# Patient Record
Sex: MALE | Race: WHITE | URBAN - METROPOLITAN AREA
[De-identification: names, ages, dates, MRNs, and addresses within clinical notes are randomized per-mention and may not be internally consistent; named-entity substitution may affect disease eponyms.]

---

## 2017-01-16 ENCOUNTER — TELEPHONE (OUTPATIENT)
Dept: CARDIOLOGY | Facility: CLINIC | Age: 32
End: 2017-01-16

## 2017-01-16 NOTE — TELEPHONE ENCOUNTER
Patient calling in stating that he would like to be seen for chest pain.  Patient states that he has had this chest pain for eight years, he usually gets it if he is stressed or hasn't been sleeping well.  It happens very rarely with exercise.  Patient denies any pain radiating to his jaw or arms or shortness of breath.  He states that he also has exercise induced asthma so when he works out he may be little more short of breath then normal.  He states he was seen a long time ago for an irregular heart beat and had an EKG.  He hasn't had much issues with that since he has cut back on his caffeine intake.   Instructed patient to present to the ER if he develops any nausea, vomiting, shortness of breath or pain radiating. Appointment set up to see Cardiology this Saturday.  Patient states that he understands information provided and will call back with any further questions or concerns.    Kiel Chaidez, RN  RN Care Coordinator  [facility]  [phone]

## 2017-01-20 ENCOUNTER — PRE VISIT (OUTPATIENT)
Dept: CARDIOLOGY | Facility: CLINIC | Age: 32
End: 2017-01-20

## 2017-01-20 DIAGNOSIS — R07.9 CHEST PAIN: Primary | ICD-10-CM

## 2017-01-21 ENCOUNTER — OFFICE VISIT (OUTPATIENT)
Dept: CARDIOLOGY | Facility: CLINIC | Age: 32
End: 2017-01-21
Attending: INTERNAL MEDICINE
Payer: COMMERCIAL

## 2017-01-21 VITALS
DIASTOLIC BLOOD PRESSURE: 75 MMHG | BODY MASS INDEX: 26.56 KG/M2 | OXYGEN SATURATION: 97 % | WEIGHT: 169.2 LBS | SYSTOLIC BLOOD PRESSURE: 125 MMHG | HEIGHT: 67 IN | HEART RATE: 77 BPM

## 2017-01-21 DIAGNOSIS — R07.89 ATYPICAL CHEST PAIN: Primary | ICD-10-CM

## 2017-01-21 PROCEDURE — 99212 OFFICE O/P EST SF 10 MIN: CPT | Mod: ZF

## 2017-01-21 PROCEDURE — 93010 ELECTROCARDIOGRAM REPORT: CPT | Mod: ZP | Performed by: INTERNAL MEDICINE

## 2017-01-21 PROCEDURE — 99204 OFFICE O/P NEW MOD 45 MIN: CPT | Performed by: INTERNAL MEDICINE

## 2017-01-21 PROCEDURE — 93005 ELECTROCARDIOGRAM TRACING: CPT | Mod: ZF

## 2017-01-21 ASSESSMENT — PAIN SCALES - GENERAL: PAINLEVEL: NO PAIN (0)

## 2017-01-21 NOTE — MR AVS SNAPSHOT
After Visit Summary   1/21/2017    Javier Fowler    MRN: [id number]           Patient Information     Date Of Birth          1985        Visit Information        Provider Department      1/21/2017 11:00 AM AMANDA Cobos MD [facility]        Today's Diagnoses     Chest pain    -  1       Care Instructions    You were seen today in the Cardiovascular Clinic at the [facility].     Cardiology Providers you saw during your visit: Dr Seymour Rosenberg    Diagnosis: Chest pain    Results: Dr Dorman reviewed the results of your EKG with you in clinic today    Recommendations:   1.  We will schedule an exercise stress test.  For this, nothing to eat or drink 3 hour prior.  No caffeine, alcohol, or tobacco 12 hours prior.  2.  We will draw a lipid panel.  For this, nothing to eat or drink 8 hours prior except for water    Follow-up: Follow up to be determined based on results of testing.       For emergencies call 911.    For any scheduling needs or nursing related questions, please call [phone].    Thank you for your visit today! If you have questions or concerns about today's visit, please call me.      Kiel Chaidez RN  RN Care Coordinator  [facility]  [phone]    Press option 1 for the University and then 3 for nursing related questions                Follow-ups after your visit        Follow-up notes from your care team     Return if symptoms worsen or fail to improve, for Dr Dorman Follow up Dx. chest pain.      Your next 10 appointments already scheduled     Jan 31, 2017 10:00 AM   LAB with  LAB    Health Lab ([facility])    [address] [place] [id number] [phone]           Patient must bring picture ID.  Patient should be prepared to give a urine specimen  Please do not eat 10-12 hours before your appointment if you are coming in fasting for labs on lipids, cholesterol, or  glucose (sugar).  Pregnant women should follow their Care Team instructions. Water with medications is okay. Do not drink coffee or other fluids.   If you have concerns about taking  your medications, please ask at office or if scheduling via Bloglovin, send a message by clicking on Secure Messaging, Message Your Care Team.            Jan 31, 2017 10:30 AM   Cardiac Stress Test with [facility]   [facility] ([facility])    [address]  [facility] [place] [address]              1.  Please bring or wear a comfortable two-piece outfit and walking shoes. 2.  Stop eating 3 hours before the test. You may drink water or juice. 3.  Stop all caffeine 12 hours before the test. This includes coffee, tea, soda pop, chocolate and certain medicines (such as Anacin and Excederin). Also avoid decaf coffee and tea, as these contain small amounts of caffeine. 4.  No alcohol, smoking or use of other tobacco products for 12 hours before the test. 5.  Refer to your provider instructions to see if you need to stop any medications (such as beta-blockers or nitrates) for this test. 6.  For patients with diabetes:     -   If you take insulin, call your diabetes care team. Ask if you should take a   dose the morning of your test.     -   If you take diabetes medicine by mouth, don't take it on the morning of your test. Bring it with you to take after the test.  (If you have questions, call your diabetes care team) 7.  When you arrive, please tell us if:     -   You have diabetes.     -   You have taken Viagra, Cialis or Levitra in the past 48 hours. 8.  For any questions that cannot be answered, please contact the ordering physician              Future tests that were ordered for you today     Open Future Orders        Priority Expected Expires Ordered    Exercise Stress test Routine  3/7/2017 1/21/2017    Lipid panel reflex to direct LDL Routine  1/22/2018 1/21/2017            Who to contact     If  "you have questions or need follow up information about today's clinic visit or your schedule please contact [facility] directly at [phone].  Normal or non-critical lab and imaging results will be communicated to you by babbelhart, letter or phone within 4 business days after the clinic has received the results. If you do not hear from us within 7 days, please contact the clinic through babbelhart or phone. If you have a critical or abnormal lab result, we will notify you by phone as soon as possible.  Submit refill requests through Hairdressr or call your pharmacy and they will forward the refill request to us. Please allow 3 business days for your refill to be completed.          Additional Information About Your Visit        Hairdressr Information     Hairdressr gives you secure access to your electronic health record. If you see a primary care provider, you can also send messages to your care team and make appointments. If you have questions, please call your primary care clinic.  If you do not have a primary care provider, please call [phone] and they will assist you.        Care EveryWhere ID     This is your Care EveryWhere ID. This could be used by other organizations to access your [facility] medical records  [id number]        Your Vitals Were     Pulse Height BMI (Body Mass Index) Pulse Oximetry          77 1.702 m (5' 7\") 26.49 kg/m2 97%         Blood Pressure from Last 3 Encounters:   01/21/17 125/75    Weight from Last 3 Encounters:   01/21/17 76.749 kg (169 lb 3.2 oz)              We Performed the Following     EKG 12-lead, tracing only (Future)        Primary Care Provider    None Specified       No primary provider on file.        Thank you!     Thank you for choosing [facility]  for your care. Our goal is always to provide you with excellent care. Hearing back from our patients is one way we can continue to improve our services. Please take a few minutes to complete the written survey " that you may receive in the mail after your visit with us. Thank you!             Your Updated Medication List - Protect others around you: Learn how to safely use, store and throw away your medicines at www.disposemymeds.org.          This list is accurate as of: 1/21/17 11:23 AM.  Always use your most recent med list.                   Brand Name Dispense Instructions for use    BENADRYL PO      Take 1 tablet by mouth as needed       MELATONIN PO      Take 1 tablet by mouth as needed       MULTIVITAMIN PO      Take 1 tablet by mouth

## 2017-01-21 NOTE — NURSING NOTE
Cardiac Testing: Patient given instructions regarding exercise stress test. Discussed purpose, preparation, procedure and when to expect results reported back to the patient. Patient demonstrated understanding of this information and agreed to call with further questions or concerns.    Labs:  Patient demonstrated understanding of this information and agreed to call with further questions or concerns.     Med Reconcile: Reviewed and verified all current medications with the patient. The updated medication list was printed and given to the patient.    Return Appointment: Follow up to be determined based on results of testing.  Patient given instructions regarding scheduling next clinic visit. Patient demonstrated understanding of this information and agreed to call with further questions or concerns.    Patient stated he understood all health information given and agreed to call with further questions or concerns.    Kiel Chaidez, RN  RN Care Coordinator  [facility]  [phone]

## 2017-01-21 NOTE — Clinical Note
Date:January 23, 2017      Patient was self referred, no letter generated. Do not send.        [facility] Information

## 2017-01-21 NOTE — PROGRESS NOTES
SUBJECTIVE:  Javier Fowler is a 31 year old male who presents for evaluation of chest pain. Very atypical.  Get chest pain when he is resting at night. 3/10. No associated symptoms.  Exercise in gym regularly with no symptoms. Can bike miles with no problem..  Chest pain also when he smoke. Quit smoking. Smoked pot during grad school.  Had symptoms for over few years but he was trying to figure out the cause.  No co-morbidities.  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    Patient Active Problem List    Diagnosis Date Noted     Chest pain      Priority: Medium    .  Current Outpatient Prescriptions   Medication Sig     Multiple Vitamins-Minerals (MULTIVITAMIN PO) Take 1 tablet by mouth     MELATONIN PO Take 1 tablet by mouth as needed     DiphenhydrAMINE HCl (BENADRYL PO) Take 1 tablet by mouth as needed     No current facility-administered medications for this visit.     Past Medical History   Diagnosis Date     Chest pain      No past surgical history on file.  Allergies   Allergen Reactions     Amoxicillin Rash     Penicillins Rash     Social History     Social History     Marital Status: Single     Spouse Name: N/A     Number of Children: N/A     Years of Education: N/A     Occupational History     Not on file.     Social History Main Topics     Smoking status: Never Smoker      Smokeless tobacco: Not on file     Alcohol Use: Yes      Comment: socially     Drug Use: No      Comment: marijuana previously     Sexual Activity: Not on file     Other Topics Concern     Not on file     Social History Narrative     No family history on file.       REVIEW OF SYSTEMS:  General: negative, fever, chills, night sweats  Skin: negative, acne, rash and scaling  Eyes: negative, double vision, eye pain and photophobia  Ears/Nose/Throat: negative, nasal congestion and purulent rhinorrhea  Respiratory: No dyspnea on exertion, No cough, No hemoptysis and negative  Cardiovascular: negative, palpitations, tachycardia,  "irregular heart beat, exertional chest pain or pressure, paroxysmal nocturnal dyspnea, dyspnea on exertion and orthopnea  Gastrointestinal: negative, dysphagia, nausea, vomiting and heartburn  Genitourinary: negative, nocturia, dysuria and frequency  Musculoskeletal: negative, fracture, back pain and neck pain  Neurologic: negative, headaches, syncope, stroke, seizures and paralysis  Psychiatric: negative, nervous breakdown, thoughts of self-harm and thoughts of hurting someone else  Hematologic/Lymphatic/Immunologic: negative, bleeding disorder, chills and fever  Endocrine: negative, cold intolerance, heat intolerance and hot flashes       OBJECTIVE:  Blood pressure 125/75, pulse 77, height 1.702 m (5' 7\"), weight 76.749 kg (169 lb 3.2 oz), SpO2 97 %.  General Appearance: healthy, alert, active and no distress  Head: Normocephalic. No masses, lesions, tenderness or abnormalities  Eyes: conjuctiva clear, PERRL, EOM intact  Ears: External ears normal. Canals clear. TM's normal.  Nose: Nares normal  Mouth: normal  Neck: Supple, no cervical adenopathy, no thyromegaly  Lungs: clear to auscultation  Cardiac: regular rate and rhythm, normal S1 and S2, no murmur  Abdomen: Soft, nontender.  Normal bowel sounds.  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 None at Gym or biking. No co-morbidities. Ex smiker. No premature CAD in family.  Patient re-assured as his symptoms sound non cardiac.  Will check a stress echo.  Also will get a lipid profile.    EKG done in clinic reviewed. NSR.Normal.  Per orders.   Return to Clinic  PRN.  "

## 2017-01-21 NOTE — Clinical Note
1/21/2017      RE: Javier Fowler  [address] [place] [address]       Dear Colleague,    Thank you for the opportunity to participate in the care of your patient, Javier Fowler, at the [facility]. Please see a copy of my visit note below.       SUBJECTIVE:  Javier Fowler is a 31 year old male who presents for evaluation of chest pain. Very atypical.  Get chest pain when he is resting at night. 3/10. No associated symptoms.  Exercise in gym regularly with no symptoms. Can bike miles with no problem..  Chest pain also when he smoke. Quit smoking. Smoked pot during grad school.  Had symptoms for over few years but he was trying to figure out the cause.  No co-morbidities.  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    Patient Active Problem List    Diagnosis Date Noted     Chest pain      Priority: Medium    .  Current Outpatient Prescriptions   Medication Sig     Multiple Vitamins-Minerals (MULTIVITAMIN PO) Take 1 tablet by mouth     MELATONIN PO Take 1 tablet by mouth as needed     DiphenhydrAMINE HCl (BENADRYL PO) Take 1 tablet by mouth as needed     No current facility-administered medications for this visit.     Past Medical History   Diagnosis Date     Chest pain      No past surgical history on file.  Allergies   Allergen Reactions     Amoxicillin Rash     Penicillins Rash     Social History     Social History     Marital Status: Single     Spouse Name: N/A     Number of Children: N/A     Years of Education: N/A     Occupational History     Not on file.     Social History Main Topics     Smoking status: Never Smoker      Smokeless tobacco: Not on file     Alcohol Use: Yes      Comment: socially     Drug Use: No      Comment: marijuana previously     Sexual Activity: Not on file     Other Topics Concern     Not on file     Social History Narrative     No family history on file.       REVIEW OF SYSTEMS:  General: negative, fever,  "chills, night sweats  Skin: negative, acne, rash and scaling  Eyes: negative, double vision, eye pain and photophobia  Ears/Nose/Throat: negative, nasal congestion and purulent rhinorrhea  Respiratory: No dyspnea on exertion, No cough, No hemoptysis and negative  Cardiovascular: negative, palpitations, tachycardia, irregular heart beat, exertional chest pain or pressure, paroxysmal nocturnal dyspnea, dyspnea on exertion and orthopnea  Gastrointestinal: negative, dysphagia, nausea, vomiting and heartburn  Genitourinary: negative, nocturia, dysuria and frequency  Musculoskeletal: negative, fracture, back pain and neck pain  Neurologic: negative, headaches, syncope, stroke, seizures and paralysis  Psychiatric: negative, nervous breakdown, thoughts of self-harm and thoughts of hurting someone else  Hematologic/Lymphatic/Immunologic: negative, bleeding disorder, chills and fever  Endocrine: negative, cold intolerance, heat intolerance and hot flashes       OBJECTIVE:  Blood pressure 125/75, pulse 77, height 1.702 m (5' 7\"), weight 76.749 kg (169 lb 3.2 oz), SpO2 97 %.  General Appearance: healthy, alert, active and no distress  Head: Normocephalic. No masses, lesions, tenderness or abnormalities  Eyes: conjuctiva clear, PERRL, EOM intact  Ears: External ears normal. Canals clear. TM's normal.  Nose: Nares normal  Mouth: normal  Neck: Supple, no cervical adenopathy, no thyromegaly  Lungs: clear to auscultation  Cardiac: regular rate and rhythm, normal S1 and S2, no murmur  Abdomen: Soft, nontender.  Normal bowel sounds.  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 None at Gym or biking. No co-morbidities. Ex smiker. No premature CAD in family.  Patient re-assured as his symptoms " sound non cardiac.  Will check a stress echo.  Also will get a lipid profile.    EKG done in clinic reviewed. NSR.Normal.  Per orders.   Return to Clinic  PRN.    Please do not hesitate to contact me if you have any questions/concerns.     Sincerely,     AMANDA Cobos MD

## 2017-01-21 NOTE — PATIENT INSTRUCTIONS
You were seen today in the Cardiovascular Clinic at the [facility].     Cardiology Providers you saw during your visit: Dr Seymour Rosenberg    Diagnosis: Chest pain    Results: Dr Dorman reviewed the results of your EKG with you in clinic today    Recommendations:   1.  We will schedule an exercise stress test.  For this, nothing to eat or drink 3 hour prior.  No caffeine, alcohol, or tobacco 12 hours prior.  2.  We will draw a lipid panel.  For this, nothing to eat or drink 8 hours prior except for water    Follow-up: Follow up to be determined based on results of testing.       For emergencies call 911.    For any scheduling needs or nursing related questions, please call [phone].    Thank you for your visit today! If you have questions or concerns about today's visit, please call me.      Kiel Chaidez RN  RN Care Coordinator  [facility]  [phone]    Press option 1 for the [place] and then 3 for nursing related questions

## 2017-01-23 LAB — INTERPRETATION ECG - MUSE: NORMAL

## 2017-01-31 ENCOUNTER — HOSPITAL ENCOUNTER (OUTPATIENT)
Dept: CARDIOLOGY | Facility: CLINIC | Age: 32
Discharge: HOME | End: 2017-01-31
Attending: INTERNAL MEDICINE | Admitting: INTERNAL MEDICINE
Payer: COMMERCIAL

## 2017-01-31 LAB
CHOLEST SERPL-MCNC: 146 MG/DL
HDLC SERPL-MCNC: 49 MG/DL
LDLC SERPL CALC-MCNC: 84 MG/DL
NONHDLC SERPL-MCNC: 97 MG/DL
TRIGL SERPL-MCNC: 63 MG/DL

## 2017-01-31 PROCEDURE — 93018 CV STRESS TEST I&R ONLY: CPT | Performed by: INTERNAL MEDICINE

## 2017-01-31 PROCEDURE — 93017 CV STRESS TEST TRACING ONLY: CPT | Performed by: INTERNAL MEDICINE

## 2017-03-08 ENCOUNTER — TELEPHONE (OUTPATIENT)
Dept: UROLOGY | Facility: CLINIC | Age: 32
End: 2017-03-08

## 2017-03-08 NOTE — TELEPHONE ENCOUNTER
Pt calling with testicular pain. Started about a week ago. Advised him if its sharp pain he is to go to ER to rule out torsion. Pt stated its not sharp and he had it for a week now. Scheduled him to see Dr. Nieto this coming Fri. Message sent to Dr. Nieto to see if scrotal US is needed prior to the appt.   Glenda Lovett RN

## 2017-03-09 DIAGNOSIS — N50.819 TESTICULAR PAIN: Primary | ICD-10-CM

## 2017-03-10 ENCOUNTER — OFFICE VISIT (OUTPATIENT)
Dept: UROLOGY | Facility: CLINIC | Age: 32
End: 2017-03-10

## 2017-03-10 ENCOUNTER — HOSPITAL ENCOUNTER (OUTPATIENT)
Dept: ULTRASOUND IMAGING | Facility: CLINIC | Age: 32
Discharge: HOME | End: 2017-03-10
Attending: UROLOGY | Admitting: UROLOGY
Payer: COMMERCIAL

## 2017-03-10 VITALS
WEIGHT: 163 LBS | DIASTOLIC BLOOD PRESSURE: 66 MMHG | SYSTOLIC BLOOD PRESSURE: 123 MMHG | HEART RATE: 65 BPM | HEIGHT: 67 IN | BODY MASS INDEX: 25.58 KG/M2

## 2017-03-10 DIAGNOSIS — N50.819 TESTICULAR PAIN: Primary | ICD-10-CM

## 2017-03-10 DIAGNOSIS — N50.819 TESTICULAR PAIN: ICD-10-CM

## 2017-03-10 PROCEDURE — 76870 US EXAM SCROTUM: CPT

## 2017-03-10 PROCEDURE — 93976 VASCULAR STUDY: CPT

## 2017-03-10 ASSESSMENT — PAIN SCALES - GENERAL: PAINLEVEL: MODERATE PAIN (4)

## 2017-03-10 NOTE — PROGRESS NOTES
"I am seeing Javier Fowler in consultation for evaluation of testis pain.    HPI:  Javier Fowler is a 31 year old male with right> left testis pain.  Has had a few episodes of this.  This is first on the right side.  This episode started 1 week ago on snowboard trip to [place].  He felt maybe related to increased activity.  Discomfort is worse with exercise.    Had a few episodes in the past.  But never had treatment.  Just observed in past.    REVIEW OF SYSTEMS:  General: negative  Skin: negative  Eyes: negative  Ears/Nose/Throat: negative  Respiratory: negative  Cardiovascular: negative  Gastrointestinal: negative  Genitourinary: see HPI  Musculoskeletal: negative  Neurologic: negative  Psychiatric: negative  Hematologic/Lymphatic/Immunologic: negative  Endocrine: negative    PAST MEDICAL HX:  Past Medical History   Diagnosis Date     Chest pain      PAST SURG HX:  Ortho surgeries.    FAMILY HX:  History of cancer.    SOCIAL HX:  Social History   Substance Use Topics     Smoking status: Never Smoker     Smokeless tobacco: Not on file     Alcohol use Yes      Comment: socially       MEDICATIONS:  Current Outpatient Prescriptions   Medication Sig     Multiple Vitamins-Minerals (MULTIVITAMIN PO) Take 1 tablet by mouth     MELATONIN PO Take 1 tablet by mouth as needed     DiphenhydrAMINE HCl (BENADRYL PO) Take 1 tablet by mouth as needed     No current facility-administered medications for this visit.        ALLERGIES:  Amoxicillin and Penicillins      GENERAL PHYSICAL EXAM:     /66 (BP Location: Right arm, Cuff Size: Adult Large)  Pulse 65  Ht 1.702 m (5' 7\")  Wt 73.9 kg (163 lb)  BMI 25.53 kg/m2   Constitutional: No acute distress. Well nourished.   PSYCH: normal mood and affect.  NEURO: normal gait, no focal deficits.   EYES: anicteric, EOMI, PERR.  CARDIOPULMONARY: breathing non-labored, pulse regular rate/rhythm, no peripheral edema.  GI: Abdomen soft, non-tender, no surgical scars, no " organomegaly.  MUSCULOSKELETAL: normal limb proportions, no muscle wasting, no contractures.  SKIN: Normal virilized hair distribution, no lesions, warts or rashes over genitalia, abdomen extremities or face.  HEME/LYMPH: no ecchymosis, no lymphadenopathy in groin or neck, no lymphedema.     EXAM:  Phallus  circumcised, meatus adequate, no plaques palpated.   Left testis descended , size is 20 , consistency is normal . No intra-testicular masses.   Right testis descended , size is 20 , consistency is normal . No intra-testicular masses.   Epididymes present, non-tender, right epididymis had head cysts, nontender.  Left cord: Vas present. no obvious varicocele noted.  Right cord: Vas present. No obvious  varicocele noted.     Prostate exam: deferred     Imaging/labs:  Lab Results   Component Value Date    CR 0.69 11/05/2008     No results found for: PSA    Scrotal ultrasound 3/10/17   Impression:   Multiple epididymal head cysts bilaterally, otherwise normal  testicular ultrasound.    ASSESSMENT:     Nonspecific bilateral testis pain/ groin pain.  Improving some over the last few weeks.    Suspect musculoskeletal pain.    No evidence of torsion, tumor, infection/ epididymitis.    Bilateral epididymal head cysts.    PLAN:    Advised observation.      Pay attention to what brings on the discomfort.    Pain seems mechanical- maybe musculoskeletal or nerve pinch/irritation.    Does not seem like prostatitis.  Discussed trial of antibiotics, post-prostate massage UA/UCx, semen analysis/ culture if symptoms persist.    Gopi Nieto MD     Urological Surgeon

## 2017-03-10 NOTE — MR AVS SNAPSHOT
After Visit Summary   3/10/2017    Javier Fowler    MRN: [id number]           Patient Information     Date Of Birth          1985        Visit Information        Provider Department      3/10/2017 10:00 AM Gopi Nieto MD [facility] and [facility]        Today's Diagnoses     Testicular pain    -  1       Follow-ups after your visit        Follow-up notes from your care team     Return if symptoms worsen or fail to improve.      Who to contact     Please call your clinic at [phone] to:    Ask questions about your health    Make or cancel appointments    Discuss your medicines    Learn about your test results    Speak to your doctor   If you have compliments or concerns about an experience at your clinic, or if you wish to file a complaint, please contact [facility] Patient Relations at [phone] or email us at [email or web address]         Additional Information About Your Visit        MyChart Information     [employer] gives you secure access to your electronic health record. If you see a primary care provider, you can also send messages to your care team and make appointments. If you have questions, please call your primary care clinic.  If you do not have a primary care provider, please call [phone] and they will assist you.      [employer] is an electronic gateway that provides easy, online access to your medical records. With [employer], you can request a clinic appointment, read your test results, renew a prescription or communicate with your care team.     To access your existing account, please contact your [facility] or call [phone] for assistance.        Care EveryWhere ID     This is your Care EveryWhere ID. This could be used by other organizations to access your [place] medical records  [id number]        Your Vitals Were     Pulse Height BMI (Body Mass Index)     "         65 1.702 m (5' 7\") 25.53 kg/m2          Blood Pressure from Last 3 Encounters:   03/10/17 123/66   01/21/17 125/75    Weight from Last 3 Encounters:   03/10/17 73.9 kg (163 lb)   01/21/17 76.7 kg (169 lb 3.2 oz)              Today, you had the following     No orders found for display       Primary Care Provider    Physician No Ref-Primary       No address on file        Thank you!     Thank you for choosing [facility] UROLOGY AND [facility] FOR PROSTATE AND UROLOGIC CANCERS  for your care. Our goal is always to provide you with excellent care. Hearing back from our patients is one way we can continue to improve our services. Please take a few minutes to complete the written survey that you may receive in the mail after your visit with us. Thank you!             Your Updated Medication List - Protect others around you: Learn how to safely use, store and throw away your medicines at www.disposemymeds.org.          This list is accurate as of: 3/10/17 11:59 PM.  Always use your most recent med list.                   Brand Name Dispense Instructions for use    BENADRYL PO      Take 1 tablet by mouth as needed       MELATONIN PO      Take 1 tablet by mouth as needed       MULTIVITAMIN PO      Take 1 tablet by mouth         "

## 2017-04-13 ENCOUNTER — TELEPHONE (OUTPATIENT)
Dept: SURGERY | Facility: CLINIC | Age: 32
End: 2017-04-13

## 2017-04-13 NOTE — TELEPHONE ENCOUNTER
Pre Visit Call and Assessment    Date of call:  04/13/2017    Phone numbers:  Home number on file [phone] (home)    Reached patient/confirmed appointment:  Yes  Patient care team/Primary provider:  No Ref-Primary, Physician  Preferred outpatient pharmacy:    [id number] [place] [address] [address]  Phone: [phone] Fax: [phone]    Referred to:  Dr. MAK Hogan    Reason for visit:  Consult inguinal hernia       Problem List:    Patient Active Problem List   Diagnosis     Chest pain       Allergies:     Allergies   Allergen Reactions     Amoxicillin Rash     Penicillins Rash       History:      Past Medical History:   Diagnosis Date     Chest pain        No past surgical history on file.    No family history on file.    Social History   Substance Use Topics     Smoking status: Never Smoker     Smokeless tobacco: Not on file     Alcohol use Yes      Comment: socially       Patient instructions:    *  Bring outside medical records, images/disc, and/or studies

## 2017-04-14 ENCOUNTER — OFFICE VISIT (OUTPATIENT)
Dept: SURGERY | Facility: CLINIC | Age: 32
End: 2017-04-14

## 2017-04-14 VITALS
HEART RATE: 83 BPM | BODY MASS INDEX: 25.65 KG/M2 | TEMPERATURE: 97.5 F | SYSTOLIC BLOOD PRESSURE: 122 MMHG | HEIGHT: 67 IN | OXYGEN SATURATION: 98 % | DIASTOLIC BLOOD PRESSURE: 73 MMHG | WEIGHT: 163.4 LBS

## 2017-04-14 DIAGNOSIS — K40.90 RIGHT INGUINAL HERNIA: Primary | ICD-10-CM

## 2017-04-14 RX ORDER — CLINDAMYCIN PHOSPHATE 900 MG/50ML
900 INJECTION, SOLUTION INTRAVENOUS
Status: CANCELLED | OUTPATIENT
Start: 2017-04-14

## 2017-04-14 RX ORDER — CLINDAMYCIN PHOSPHATE 900 MG/50ML
900 INJECTION, SOLUTION INTRAVENOUS SEE ADMIN INSTRUCTIONS
Status: CANCELLED | OUTPATIENT
Start: 2017-04-14

## 2017-04-14 ASSESSMENT — PAIN SCALES - GENERAL: PAINLEVEL: EXTREME PAIN (8)

## 2017-04-14 NOTE — NURSING NOTE
"Chief Complaint   Patient presents with     Consult     Inguinal Hernia Consultation       Vitals:    04/14/17 0746   BP: 122/73   BP Location: Left arm   Patient Position: Chair   Cuff Size: Adult Regular   Pulse: 83   Temp: 97.5  F (36.4  C)   SpO2: 98%   Weight: 163 lb 6.4 oz   Height: 5' 7\"       Body mass index is 25.59 kg/(m^2).      Greer Urbina                          "

## 2017-04-14 NOTE — PATIENT INSTRUCTIONS
Open Inguinal Hernia Repair Teaching Sheet      1.  Wound care:  Remove gauze dressing 24 hours after surgery.  Leave the medical tape (Steri-strips) in place.  The tape should stay on for 5-7 days.  You may remove the Steri-Strips after one week.   Please wash your hands before touching your incisions or removing dressings.    2.  You may resume all of your home medications after surgery.  Please do not start Aspirin or blood thinners until the day after surgery.    3.  You may shower 24 hours after surgery. Do not submerge yourself in water for 7 days (bath, whirlpool, hot tub, pool, lake, etc).      4.  Restrictions:  No lifting in excess of 20 pounds for 3-4 weeks from the date of surgery.    5.  Pain management:  Apply ice pack to groin for 24 hours protecting the skin with a cloth.  Take prescribed pain medication as directed and only as needed.  Please do not take any additional Acetaminophen or Tylenol products while taking narcotic pain medications.  We encourage the use of Ibuprofen, Advil, or Motrin after surgery except if you have an allergy, ulcer, kidney problems, or it is contraindicated by a provider.  A TAP Block may be recommended the day of surgery (see information sheet below).    6.  Athletic supporter (male):  Wear for the first 3 days.  You may wear longer if you wish.    7.  Our office will call you 2-4 days after your procedure to review post-op teaching, answer questions, and help arrange after surgery care.    8.  Watch for signs of infection:  Redness of the wound, drainage, increasing pain, and/or fever/chills (greater than 101 degrees F).    9.  Constipation:  Please take prescribed stool softener as directed.  You may stop taking it when you are no longer taking narcotic pain medications and your bowels have returned to normal.  If you become constipated it is safe to take an over-the-counter laxative as directed on the bottle.    10. Driving:  You may drive  when you are no longer  taking prescription pain medications  and you feel comfortable operating a vehicle.       11. Diet:  You may resume your regular diet after surgery.      If you have questions or concerns please contact our office Monday through Friday during regular office hours at [phone].  If you are calling during nonbusiness hours, the weekend, or holiday please call the hospital  at [phone] and ask for the on-call General Surgeon.

## 2017-04-14 NOTE — LETTER
4/14/2017       RE: Javier Fowler  [address] [place] [address]     Dear Colleague,    Thank you for referring your patient, Javier Fowler, to the [facility] GENERAL SURGERY at [facility]. Please see a copy of my visit note below.    History and Physical - General Surgery Attending    NAME: Javier Fowler,  31 year old male    PCP: No Ref-Primary, Physician  MRN:  [id number]       Ph#: None    Date: April 14, 2017    History of Present Illness: Javier Fowler 31 year old male presenting for evaluation of Right groin hernia.    Pt has pain constantly radiating to his Right testicle and sometimes along Right medial thigh c/w Right ileo-inguinal nerve distribution.    Past Medical History:  Past Medical History:   Diagnosis Date     Chest pain      Past Surgical History:  Rhinoplasty 2002  Left wrist Ortho repair    Family History:  Mother: Alive; good health  Father: Alive; Prostate [place], [place]    Social History:  Social History     Social History     Marital status: Single     Spouse name: N/A     Number of children: N/A     Years of education: N/A     Occupational History     Not on file.     Social History Main Topics     Smoking status: Never Smoker     Smokeless tobacco: Not on file     Alcohol use Yes      Comment: socially     Drug use: No      Comment: marijuana previously     Sexual activity: Not on file     Other Topics Concern     Not on file     Social History Narrative     Allergies:  Allergies   Allergen Reactions     Amoxicillin Rash     Penicillins Rash     Outpatient Medications:    Current Outpatient Prescriptions on File Prior to Visit:  Multiple Vitamins-Minerals (MULTIVITAMIN PO) Take 1 tablet by mouth   MELATONIN PO Take 1 tablet by mouth as needed   DiphenhydrAMINE HCl (BENADRYL PO) Take 1 tablet by mouth as needed     No current facility-administered medications on file prior to visit.   Current Outpatient Prescriptions   Medication Sig Dispense  "Refill     Ibuprofen (ADVIL PO) Take 200 mg by mouth       Acetaminophen (TYLENOL PO) Take 10 mg/kg by mouth       Multiple Vitamins-Minerals (MULTIVITAMIN PO) Take 1 tablet by mouth       MELATONIN PO Take 1 tablet by mouth as needed       DiphenhydrAMINE HCl (BENADRYL PO) Take 1 tablet by mouth as needed       Review of Systems (14 point review):  Pos for Right groin pain constant and intermittently exacerbated    Physical Exam:  Vitals: T: 97.5, P: 83, 122/73, R: Data Unavailable  Height: 5' 7\" Weight: 163 lbs 6.4 oz   Body mass index is 25.59 kg/(m^2).    General: Young M, comfortable, NAD    HEENT: <Head> NC/AT  <Eyes> Sclera anicteric, PERRLA, EOMI  <Ears> Pinna symmetric, nontender auditory canal, TMs intact b/l  <Nose> Atraumatic, no nasal discharge, WNL  <Throat> Oropharynx WNL, mucous membranes moist and pink, no petechiae, edema or exudates.  Uvula and tongue midline, good dentition    Neck:  Supple, no neck masses, no cervical or posterior lymphadenopathy   Thyroid midline, no thyromegaly or palpable nodules, no acanthosis nigricans    Cardiac: S1 S2 auscultated, no MRG.  No cardiac bruits appreciated.    Pulmonary: Clear to auscultation b/l, no WRR.  Tactile fremitus WNL,  No dullness to percussion.    Chest/Back: Nontender chest, symmetrical rib cage, atraumatic.  No CVA tenderness.    Breast/Axilla: Symmetrical breast tissue, no breast lumps, no nipple discharge.        Axilla:  No supraclavicular or axillary lymphadenopathy.    Abdominal:    Flat, S/NT,    Bowel sounds: normal     Pelvic/Groin: Right inguinal hernia on valsalva maneuver.  No femoral lymphadenopathy.    Genitourinary: External genitalia WNL for age.   Testes descended b/l, mild tenderness on Right.    Rectal:  Deferred    Extremity: Warm, well-perfused, no CCE    Vascular: +2 femoral, popliteal, PT/DP pulses b/l    Neurologic: Alert and oriented x3.  GCS 15.  Cranial Nerves II-XII grossly intact.    no focal deficits.  Gait " WNL.    Psych: Appropriate affect     Imaging Data (I have personally reviewed the following):  None    Impression/Recommendations  30 yo M with Right Inguinal hernia and constant pain.    1. Schedule for open RIH at next available    Details of this case discussed in detail with:  __Pt__    Total time spent counselling patient and/or family >50% of visit time. __15 mins__    Total visit time: 25 mins    H. Puja Hogan MD  Pager - [phone]  Office - [phone]  Critical Care & Acute Care Surgery

## 2017-04-14 NOTE — MR AVS SNAPSHOT
After Visit Summary   4/14/2017    Javier Fowler    MRN: [id number]           Patient Information     Date Of Birth          1985        Visit Information        Provider Department      4/14/2017 8:00 AM Xiao Hogan MD [facility] Surgery        Care Instructions    Open Inguinal Hernia Repair Teaching Sheet      1.  Wound care:  Remove gauze dressing 24 hours after surgery.  Leave the medical tape (Steri-strips) in place.  The tape should stay on for 5-7 days.  You may remove the Steri-Strips after one week.   Please wash your hands before touching your incisions or removing dressings.    2.  You may resume all of your home medications after surgery.  Please do not start Aspirin or blood thinners until the day after surgery.    3.  You may shower 24 hours after surgery. Do not submerge yourself in water for 7 days (bath, whirlpool, hot tub, pool, lake, etc).      4.  Restrictions:  No lifting in excess of 20 pounds for 3-4 weeks from the date of surgery.    5.  Pain management:  Apply ice pack to groin for 24 hours protecting the skin with a cloth.  Take prescribed pain medication as directed and only as needed.  Please do not take any additional Acetaminophen or Tylenol products while taking narcotic pain medications.  We encourage the use of Ibuprofen, Advil, or Motrin after surgery except if you have an allergy, ulcer, kidney problems, or it is contraindicated by a provider.  A TAP Block may be recommended the day of surgery (see information sheet below).    6.  Athletic supporter (male):  Wear for the first 3 days.  You may wear longer if you wish.    7.  Our office will call you 2-4 days after your procedure to review post-op teaching, answer questions, and help arrange after surgery care.    8.  Watch for signs of infection:  Redness of the wound, drainage, increasing pain, and/or fever/chills (greater than 101 degrees F).    9.  Constipation:  Please take prescribed  stool softener as directed.  You may stop taking it when you are no longer taking narcotic pain medications and your bowels have returned to normal.  If you become constipated it is safe to take an over-the-counter laxative as directed on the bottle.    10. Driving:  You may drive  when you are no longer taking prescription pain medications  and you feel comfortable operating a vehicle.       11. Diet:  You may resume your regular diet after surgery.      If you have questions or concerns please contact our office Monday through Friday during regular office hours at [phone].  If you are calling during nonbusiness hours, the weekend, or holiday please call the hospital  at [phone] and ask for the on-call General Surgeon.                  Follow-ups after your visit        Who to contact     Please call your clinic at [phone] to:    Ask questions about your health    Make or cancel appointments    Discuss your medicines    Learn about your test results    Speak to your doctor   If you have compliments or concerns about an experience at your clinic, or if you wish to file a complaint, please contact [facility] Patient Relations at [phone] or email us at [email or web address]         Additional Information About Your Visit        [email or web address] Information     [employer] gives you secure access to your electronic health record. If you see a primary care provider, you can also send messages to your care team and make appointments. If you have questions, please call your primary care clinic.  If you do not have a primary care provider, please call [phone] and they will assist you.      [employer] is an electronic gateway that provides easy, online access to your medical records. With [employer], you can request a clinic appointment, read your test results, renew a prescription or communicate with your care team.     To access your existing account, please contact your  "[facility] Clinic or call [phone] for assistance.        Care EveryWhere ID     This is your Care EveryWhere ID. This could be used by other organizations to access your [place] medical records  [id number]        Your Vitals Were     Pulse Temperature Height Pulse Oximetry BMI (Body Mass Index)       83 97.5  F (36.4  C) 1.702 m (5' 7\") 98% 25.59 kg/m2        Blood Pressure from Last 3 Encounters:   04/14/17 122/73   03/10/17 123/66   01/21/17 125/75    Weight from Last 3 Encounters:   04/14/17 74.1 kg (163 lb 6.4 oz)   03/10/17 73.9 kg (163 lb)   01/21/17 76.7 kg (169 lb 3.2 oz)              Today, you had the following     No orders found for display       Primary Care Provider    Physician No Ref-Primary       No address on file        Thank you!     Thank you for choosing [facility] SURGERY  for your care. Our goal is always to provide you with excellent care. Hearing back from our patients is one way we can continue to improve our services. Please take a few minutes to complete the written survey that you may receive in the mail after your visit with us. Thank you!             Your Updated Medication List - Protect others around you: Learn how to safely use, store and throw away your medicines at www.disposemymeds.org.          This list is accurate as of: 4/14/17  8:20 AM.  Always use your most recent med list.                   Brand Name Dispense Instructions for use    ADVIL PO      Take 200 mg by mouth       BENADRYL PO      Take 1 tablet by mouth as needed       MELATONIN PO      Take 1 tablet by mouth as needed       MULTIVITAMIN PO      Take 1 tablet by mouth       TYLENOL PO      Take 10 mg/kg by mouth         "

## 2017-04-14 NOTE — PROGRESS NOTES
History and Physical - General Surgery Attending    NAME: Javier Fowler,  31 year old male    PCP: No Ref-Primary, Physician  MRN:  [id number]       Ph#: None    Date: April 14, 2017    History of Present Illness: Javier Fowler 31 year old male presenting for evaluation of Right groin hernia.    Pt has pain constantly radiating to his Right testicle and sometimes along Right medial thigh c/w Right ileo-inguinal nerve distribution.    Past Medical History:  Past Medical History:   Diagnosis Date     Chest pain      Past Surgical History:  Rhinoplasty 2002  Left wrist Ortho repair    Family History:  Mother: Alive; good health  Father: Alive; Prostate [place], [place]    Social History:  Social History     Social History     Marital status: Single     Spouse name: N/A     Number of children: N/A     Years of education: N/A     Occupational History     Not on file.     Social History Main Topics     Smoking status: Never Smoker     Smokeless tobacco: Not on file     Alcohol use Yes      Comment: socially     Drug use: No      Comment: marijuana previously     Sexual activity: Not on file     Other Topics Concern     Not on file     Social History Narrative     Allergies:  Allergies   Allergen Reactions     Amoxicillin Rash     Penicillins Rash     Outpatient Medications:    Current Outpatient Prescriptions on File Prior to Visit:  Multiple Vitamins-Minerals (MULTIVITAMIN PO) Take 1 tablet by mouth   MELATONIN PO Take 1 tablet by mouth as needed   DiphenhydrAMINE HCl (BENADRYL PO) Take 1 tablet by mouth as needed     No current facility-administered medications on file prior to visit.   Current Outpatient Prescriptions   Medication Sig Dispense Refill     Ibuprofen (ADVIL PO) Take 200 mg by mouth       Acetaminophen (TYLENOL PO) Take 10 mg/kg by mouth       Multiple Vitamins-Minerals (MULTIVITAMIN PO) Take 1 tablet by mouth       MELATONIN PO Take 1 tablet by mouth as needed       DiphenhydrAMINE HCl (BENADRYL PO) Take 1  "tablet by mouth as needed       Review of Systems (14 point review):  Pos for Right groin pain constant and intermittently exacerbated    Physical Exam:  Vitals: T: 97.5, P: 83, 122/73, R: Data Unavailable  Height: 5' 7\" Weight: 163 lbs 6.4 oz   Body mass index is 25.59 kg/(m^2).    General: Young M, comfortable, NAD    HEENT: <Head> NC/AT  <Eyes> Sclera anicteric, PERRLA, EOMI  <Ears> Pinna symmetric, nontender auditory canal, TMs intact b/l  <Nose> Atraumatic, no nasal discharge, WNL  <Throat> Oropharynx WNL, mucous membranes moist and pink, no petechiae, edema or exudates.  Uvula and tongue midline, good dentition    Neck:  Supple, no neck masses, no cervical or posterior lymphadenopathy   Thyroid midline, no thyromegaly or palpable nodules, no acanthosis nigricans    Cardiac: S1 S2 auscultated, no MRG.  No cardiac bruits appreciated.    Pulmonary: Clear to auscultation b/l, no WRR.  Tactile fremitus WNL,  No dullness to percussion.    Chest/Back: Nontender chest, symmetrical rib cage, atraumatic.  No CVA tenderness.    Breast/Axilla: Symmetrical breast tissue, no breast lumps, no nipple discharge.        Axilla:  No supraclavicular or axillary lymphadenopathy.    Abdominal:    Flat, S/NT,    Bowel sounds: normal     Pelvic/Groin: Right inguinal hernia on valsalva maneuver.  No femoral lymphadenopathy.    Genitourinary: External genitalia WNL for age.   Testes descended b/l, mild tenderness on Right.    Rectal:  Deferred    Extremity: Warm, well-perfused, no CCE    Vascular: +2 femoral, popliteal, PT/DP pulses b/l    Neurologic: Alert and oriented x3.  GCS 15.  Cranial Nerves II-XII grossly intact.    no focal deficits.  Gait WNL.    Psych: Appropriate affect     Imaging Data (I have personally reviewed the following):  None    Impression/Recommendations  30 yo M with Right Inguinal hernia and constant pain.    1. Schedule for open RIH at next available    Details of this case discussed in detail with:  " __Pt__    Total time spent counselling patient and/or family >50% of visit time. __15 mins__    Total visit time: 25 mins    H. Puja Hogan MD  Pager - [phone]  Office - [phone]  Critical Care & Acute Care Surgery

## 2017-04-19 ENCOUNTER — ANESTHESIA EVENT (OUTPATIENT)
Dept: SURGERY | Facility: AMBULATORY SURGERY CENTER | Age: 32
End: 2017-04-19

## 2017-04-20 ENCOUNTER — ANESTHESIA (OUTPATIENT)
Dept: SURGERY | Facility: AMBULATORY SURGERY CENTER | Age: 32
End: 2017-04-20

## 2017-04-20 ENCOUNTER — HOSPITAL ENCOUNTER (OUTPATIENT)
Facility: AMBULATORY SURGERY CENTER | Age: 32
End: 2017-04-20
Attending: SURGERY

## 2017-04-20 VITALS
DIASTOLIC BLOOD PRESSURE: 62 MMHG | RESPIRATION RATE: 16 BRPM | BODY MASS INDEX: 25.58 KG/M2 | OXYGEN SATURATION: 98 % | SYSTOLIC BLOOD PRESSURE: 106 MMHG | TEMPERATURE: 97.9 F | HEIGHT: 67 IN | WEIGHT: 163 LBS

## 2017-04-20 DIAGNOSIS — K40.90 UNILATERAL INGUINAL HERNIA WITHOUT OBSTRUCTION OR GANGRENE, RECURRENCE NOT SPECIFIED: Primary | ICD-10-CM

## 2017-04-20 DEVICE — MESH PROLENE 3X6" PMII: Type: IMPLANTABLE DEVICE | Site: INGUINAL | Status: FUNCTIONAL

## 2017-04-20 RX ORDER — DEXAMETHASONE SODIUM PHOSPHATE 4 MG/ML
INJECTION, SOLUTION INTRA-ARTICULAR; INTRALESIONAL; INTRAMUSCULAR; INTRAVENOUS; SOFT TISSUE PRN
Status: DISCONTINUED | OUTPATIENT
Start: 2017-04-20 | End: 2017-04-20

## 2017-04-20 RX ORDER — NALOXONE HYDROCHLORIDE 0.4 MG/ML
.1-.4 INJECTION, SOLUTION INTRAMUSCULAR; INTRAVENOUS; SUBCUTANEOUS
Status: DISCONTINUED | OUTPATIENT
Start: 2017-04-20 | End: 2017-04-20 | Stop reason: HOSPADM

## 2017-04-20 RX ORDER — LIDOCAINE HYDROCHLORIDE 20 MG/ML
INJECTION, SOLUTION INFILTRATION; PERINEURAL PRN
Status: DISCONTINUED | OUTPATIENT
Start: 2017-04-20 | End: 2017-04-20

## 2017-04-20 RX ORDER — FENTANYL CITRATE 50 UG/ML
25-50 INJECTION, SOLUTION INTRAMUSCULAR; INTRAVENOUS
Status: DISCONTINUED | OUTPATIENT
Start: 2017-04-20 | End: 2017-04-20 | Stop reason: HOSPADM

## 2017-04-20 RX ORDER — KETOROLAC TROMETHAMINE 30 MG/ML
30 INJECTION, SOLUTION INTRAMUSCULAR; INTRAVENOUS EVERY 6 HOURS PRN
Status: DISCONTINUED | OUTPATIENT
Start: 2017-04-20 | End: 2017-04-21 | Stop reason: HOSPADM

## 2017-04-20 RX ORDER — GABAPENTIN 300 MG/1
300 CAPSULE ORAL ONCE
Status: COMPLETED | OUTPATIENT
Start: 2017-04-20 | End: 2017-04-20

## 2017-04-20 RX ORDER — OXYCODONE HYDROCHLORIDE 5 MG/1
5-10 TABLET ORAL
Status: COMPLETED | OUTPATIENT
Start: 2017-04-20 | End: 2017-04-20

## 2017-04-20 RX ORDER — LIDOCAINE HYDROCHLORIDE 10 MG/ML
INJECTION, SOLUTION INFILTRATION; PERINEURAL PRN
Status: DISCONTINUED | OUTPATIENT
Start: 2017-04-20 | End: 2017-04-20 | Stop reason: HOSPADM

## 2017-04-20 RX ORDER — BUPIVACAINE HYDROCHLORIDE 2.5 MG/ML
INJECTION, SOLUTION INFILTRATION; PERINEURAL PRN
Status: DISCONTINUED | OUTPATIENT
Start: 2017-04-20 | End: 2017-04-20 | Stop reason: HOSPADM

## 2017-04-20 RX ORDER — PROPOFOL 10 MG/ML
INJECTION, EMULSION INTRAVENOUS PRN
Status: DISCONTINUED | OUTPATIENT
Start: 2017-04-20 | End: 2017-04-20

## 2017-04-20 RX ORDER — ACETAMINOPHEN 325 MG/1
975 TABLET ORAL ONCE
Status: COMPLETED | OUTPATIENT
Start: 2017-04-20 | End: 2017-04-20

## 2017-04-20 RX ORDER — SODIUM CHLORIDE, SODIUM LACTATE, POTASSIUM CHLORIDE, CALCIUM CHLORIDE 600; 310; 30; 20 MG/100ML; MG/100ML; MG/100ML; MG/100ML
INJECTION, SOLUTION INTRAVENOUS CONTINUOUS
Status: DISCONTINUED | OUTPATIENT
Start: 2017-04-20 | End: 2017-04-21 | Stop reason: HOSPADM

## 2017-04-20 RX ORDER — FENTANYL CITRATE 50 UG/ML
INJECTION, SOLUTION INTRAMUSCULAR; INTRAVENOUS PRN
Status: DISCONTINUED | OUTPATIENT
Start: 2017-04-20 | End: 2017-04-20

## 2017-04-20 RX ORDER — SODIUM CHLORIDE, SODIUM LACTATE, POTASSIUM CHLORIDE, CALCIUM CHLORIDE 600; 310; 30; 20 MG/100ML; MG/100ML; MG/100ML; MG/100ML
INJECTION, SOLUTION INTRAVENOUS CONTINUOUS
Status: DISCONTINUED | OUTPATIENT
Start: 2017-04-20 | End: 2017-04-20 | Stop reason: HOSPADM

## 2017-04-20 RX ORDER — CLINDAMYCIN PHOSPHATE 900 MG/50ML
900 INJECTION, SOLUTION INTRAVENOUS SEE ADMIN INSTRUCTIONS
Status: DISCONTINUED | OUTPATIENT
Start: 2017-04-20 | End: 2017-04-20 | Stop reason: HOSPADM

## 2017-04-20 RX ORDER — MEPERIDINE HYDROCHLORIDE 25 MG/ML
12.5 INJECTION INTRAMUSCULAR; INTRAVENOUS; SUBCUTANEOUS
Status: DISCONTINUED | OUTPATIENT
Start: 2017-04-20 | End: 2017-04-21 | Stop reason: HOSPADM

## 2017-04-20 RX ORDER — CLINDAMYCIN PHOSPHATE 900 MG/50ML
900 INJECTION, SOLUTION INTRAVENOUS
Status: COMPLETED | OUTPATIENT
Start: 2017-04-20 | End: 2017-04-20

## 2017-04-20 RX ORDER — ONDANSETRON 2 MG/ML
4 INJECTION INTRAMUSCULAR; INTRAVENOUS EVERY 30 MIN PRN
Status: DISCONTINUED | OUTPATIENT
Start: 2017-04-20 | End: 2017-04-21 | Stop reason: HOSPADM

## 2017-04-20 RX ORDER — NALOXONE HYDROCHLORIDE 0.4 MG/ML
.1-.4 INJECTION, SOLUTION INTRAMUSCULAR; INTRAVENOUS; SUBCUTANEOUS
Status: DISCONTINUED | OUTPATIENT
Start: 2017-04-20 | End: 2017-04-21 | Stop reason: HOSPADM

## 2017-04-20 RX ORDER — ONDANSETRON 2 MG/ML
INJECTION INTRAMUSCULAR; INTRAVENOUS PRN
Status: DISCONTINUED | OUTPATIENT
Start: 2017-04-20 | End: 2017-04-20

## 2017-04-20 RX ORDER — OXYCODONE HYDROCHLORIDE 5 MG/1
5-10 TABLET ORAL
Qty: 20 TABLET | Refills: 0 | Status: SHIPPED | OUTPATIENT
Start: 2017-04-20

## 2017-04-20 RX ORDER — LIDOCAINE 40 MG/G
CREAM TOPICAL
Status: DISCONTINUED | OUTPATIENT
Start: 2017-04-20 | End: 2017-04-20 | Stop reason: HOSPADM

## 2017-04-20 RX ORDER — FLUMAZENIL 0.1 MG/ML
0.2 INJECTION, SOLUTION INTRAVENOUS
Status: DISCONTINUED | OUTPATIENT
Start: 2017-04-20 | End: 2017-04-20 | Stop reason: HOSPADM

## 2017-04-20 RX ORDER — ONDANSETRON 4 MG/1
4 TABLET, ORALLY DISINTEGRATING ORAL EVERY 30 MIN PRN
Status: DISCONTINUED | OUTPATIENT
Start: 2017-04-20 | End: 2017-04-21 | Stop reason: HOSPADM

## 2017-04-20 RX ORDER — ACETAMINOPHEN 325 MG/1
650 TABLET ORAL
Status: DISCONTINUED | OUTPATIENT
Start: 2017-04-20 | End: 2017-04-21 | Stop reason: HOSPADM

## 2017-04-20 RX ORDER — OXYCODONE HYDROCHLORIDE 5 MG/1
5 TABLET ORAL ONCE
Status: COMPLETED | OUTPATIENT
Start: 2017-04-20 | End: 2017-04-20

## 2017-04-20 RX ADMIN — FENTANYL CITRATE 50 MCG: 50 INJECTION, SOLUTION INTRAMUSCULAR; INTRAVENOUS at 13:07

## 2017-04-20 RX ADMIN — FENTANYL CITRATE 25 MCG: 50 INJECTION, SOLUTION INTRAMUSCULAR; INTRAVENOUS at 15:20

## 2017-04-20 RX ADMIN — ONDANSETRON 4 MG: 2 INJECTION INTRAMUSCULAR; INTRAVENOUS at 14:29

## 2017-04-20 RX ADMIN — OXYCODONE HYDROCHLORIDE 5 MG: 5 TABLET ORAL at 16:36

## 2017-04-20 RX ADMIN — PROPOFOL 300 MG: 10 INJECTION, EMULSION INTRAVENOUS at 13:05

## 2017-04-20 RX ADMIN — CLINDAMYCIN PHOSPHATE 900 MG: 900 INJECTION, SOLUTION INTRAVENOUS at 13:03

## 2017-04-20 RX ADMIN — SODIUM CHLORIDE, SODIUM LACTATE, POTASSIUM CHLORIDE, CALCIUM CHLORIDE: 600; 310; 30; 20 INJECTION, SOLUTION INTRAVENOUS at 12:33

## 2017-04-20 RX ADMIN — LIDOCAINE HYDROCHLORIDE 100 MG: 20 INJECTION, SOLUTION INFILTRATION; PERINEURAL at 13:05

## 2017-04-20 RX ADMIN — SODIUM CHLORIDE, SODIUM LACTATE, POTASSIUM CHLORIDE, CALCIUM CHLORIDE: 600; 310; 30; 20 INJECTION, SOLUTION INTRAVENOUS at 15:00

## 2017-04-20 RX ADMIN — OXYCODONE HYDROCHLORIDE 5 MG: 5 TABLET ORAL at 15:34

## 2017-04-20 RX ADMIN — ACETAMINOPHEN 975 MG: 325 TABLET ORAL at 12:15

## 2017-04-20 RX ADMIN — GABAPENTIN 300 MG: 300 CAPSULE ORAL at 12:15

## 2017-04-20 RX ADMIN — DEXAMETHASONE SODIUM PHOSPHATE 4 MG: 4 INJECTION, SOLUTION INTRA-ARTICULAR; INTRALESIONAL; INTRAMUSCULAR; INTRAVENOUS; SOFT TISSUE at 13:07

## 2017-04-20 NOTE — ANESTHESIA PREPROCEDURE EVALUATION
Anesthesia Evaluation     .             ROS/MED HX    ENT/Pulmonary:  - neg pulmonary ROS     Neurologic:  - neg neurologic ROS     Cardiovascular:  - neg cardiovascular ROS       METS/Exercise Tolerance:     Hematologic:  - neg hematologic  ROS       Musculoskeletal:  - neg musculoskeletal ROS       GI/Hepatic:  - neg GI/hepatic ROS       Renal/Genitourinary:  - ROS Renal section negative       Endo:  - neg endo ROS       Psychiatric:  - neg psychiatric ROS       Infectious Disease:  - neg infectious disease ROS       Malignancy:         Other:                     Physical Exam  Normal systems: dental    Airway   Mallampati: II  TM distance: >3 FB  Neck ROM: full    Dental     Cardiovascular   Rhythm and rate: regular      Pulmonary    breath sounds clear to auscultation                    Anesthesia Plan      History & Physical Review  History and physical reviewed and following examination; no interval change.    ASA Status:  1 .    NPO Status:  > 8 hours    Plan for General and LMA with Intravenous induction. Maintenance will be Balanced.    PONV prophylaxis:  Ondansetron (or other 5HT-3) and Dexamethasone or Solumedrol       Postoperative Care  Postoperative pain management:  Oral pain medications and Multi-modal analgesia.      Consents  Anesthetic plan, risks, benefits and alternatives discussed with:  Patient..                          .

## 2017-04-20 NOTE — BRIEF OP NOTE
[facility]    Brief Operative Note    Pre-operative diagnosis: Inguinal Hernia  Post-operative diagnosis Same  Procedure: Procedure(s):  Open Right Inguinal Hernia Repair with Mesh - Wound Class: I-Clean  Surgeon: Surgeon(s) and Role:     * Xiao Hogan MD - Primary  Qi, MD Cory - Resident  Anesthesia: General   Estimated blood loss: 10 mL  Drains: None  Specimens:   ID Type Source Tests Collected by Time Destination   A : Right Inguinal Hernia Contents Tissue Groin SURGICAL PATHOLOGY EXAM Xiao Hogan MD 4/20/2017  1:51 PM      Findings:   None. Indirect inguinal hernia.  Complications: None.  Implants: Polypropylene mesh

## 2017-04-20 NOTE — OP NOTE
[facility]    Operative Note    Pre-operative diagnosis: Inguinal Hernia   Post-operative diagnosis Same   Procedure: Procedure(s):  Open Right Inguinal Hernia Repair with Mesh - Wound Class: I-Clean   Surgeon: Surgeon(s) and Role:     * Xiao Hogan MD - Primary  Cory Burciaga MD - Resident   Anesthesia: General    Estimated blood loss: 10 mL   Drains: None   Specimens:   ID Type Source Tests Collected by Time Destination   A : Right Inguinal Hernia Contents Tissue Groin SURGICAL PATHOLOGY EXAM Xiao Hogan MD 4/20/2017  1:51 PM       Findings: Indirect inguinal hernia.   Complications: None.   Implants: None.  Polypropylene mesh         OPERATIVE INDICATIONS:  Javier Fowler is a 31 year old male with a history of a lump/pain in the right groin.      After understanding the risks and benefits of proceeding with surgery, the patient has an indication for open right inguinal hernia repair with mesh and consented to undergo surgery.    OPERATIVE DETAILS:  The patient was brought to the operating room and prepared in a routine fashion.  A timeout was performed prior to surgery and documented by the nursing team.    The patient was positioned supine, and prepped and draped in the usual sterile fashion. A field block was produced by raising skin wheals along the proposed skin incision. Additional local anesthesia was injected during the procedure under the external oblique aponeurosis and as needed.    The skin crease incision was made with a knife ~1 fingerbreadth above and parallel to the inguinal ligament.  The incision was carried down to the aponeurosis of the external oblique, which was cleared bluntly and the external ring was exposed. Hemostasis was achieved in the wound. An incision was made in the midportion of the external oblique aponeurosis in the direction of its fibers. Flaps of the external oblique were developed superiorly and  inferiorly.    The cord was identified. It was gently dissected free at the pubic tubercle and encircled with a Penrose drain. Attention was directed to the anteromedial aspect of the cord, where a small indirect hernia sac was identified. The sac was carefully dissected free of the cord down to the level of the internal ring. The vas and testicular vessels were identified and protected from harm. Redundant sac was excised and submitted to pathology. The stump of the sac was checked for hemostasis and allowed to retract into the abdomen.    Attention then turned to the floor of the canal, which appeared to be grossly weakened without a well-defined defect or sac. The floor of the canal was approximated using 2-0 Vicryl in a running fashion. A piece of polypropylene mesh was cut to form and placed in the correct position starting at the pubic tubercle, running parallel to the inguinal ligament and encircling the cord at the internal ring. This mesh was sutured in with 2-0 Prolene in a running fashion inferiorly along the shelving edge and with interrupted sutures of 2-0 Prolene superiorly. The lateral tails were sutured together and secured to the lateral floor using 2-0 Prolene as well. The betzy-internal ring was found to be patent without constriction of the spermatic cord.     Hemostasis was again checked. The Penrose drain was removed. The external oblique aponeurosis was closed with a running suture of 2-0 Vicryl. Claudia's fascia was closed with interrupted 3-0 Vicryl. The skin was closed with a subcuticular stitch of 4-0 Vicryl. Dermabond was applied.    The patient tolerated the procedure well and was taken to the postanesthesia care unit in stable condition.    All needle and sponge counts were correct x2 at the end of the procedure.    Dr. Hogan was present and scrubbed for the entirety of the operation.       Cory Burciaga MD  PGY-1 General Surgery  [facility]

## 2017-04-20 NOTE — DISCHARGE INSTRUCTIONS
"[facility]  Home Care Following Anesthesia  For 24 hours after surgery:  1. Get plenty of rest.  A responsible adult must stay with you for at least 24 hours after you leave the surgery center.  2. Do not drive or use heavy equipment.  If you have weakness or tingling, don't drive or use heavy equipment until this feeling goes away.   3. Do not drink alcohol.   4. Avoid strenuous or risky activities.  Ask for help when climbing stairs.  5. You may feel lightheaded.  IF so, sit for a few minutes before standing.  Have someone help you get up.   6. If you have nausea (feel sick to your stomach): Drink only clear liquids such as apple juice, ginger ale, broth or 7-Up.  Rest may also help.  Be sure to drink enough fluids.  Move to a regular diet as you feel able.   7. You may have a slight fever.  Call the doctor if your fever is over 100 F (37.7 C) (taken under the tongue) or lasts longer than 24 hours.  8. You may have a dry mouth, a sore throat, muscle aches or trouble sleeping. These should go away after 24 hours.  9. Do not make important or legal decisions.     Today you received a Marcaine or bupivacaine injection to numb the area near your surgery site.  This is a block using local anesthetic or \"numbing\" medication injected around the nerves to anesthetize or \"numb\" the area supplied by those nerves.  This block is injected into the muscle layer near your surgical site.  The medication may numb the location where you had surgery for 6-18 hours, but may last up to 24 hours.  If your surgical site is an arm or leg you should be careful with your affected limb, since it is possible to injure your limb without being aware of it due to the numbing.  Until full feeling returns, you should guard against bumping or hitting your limb, and avoid extreme hot or cold temperatures on the skin.  As the block wears off, the feeling will return as a tingling or prickly sensation near your " surgical site.  You will experience more discomfort from your incision as the feeling returns.  You may want to take a pain pill (a narcotic or Tylenol if this was prescribed by your surgeon) when you start to experience mild pain before the pain beccomes more severe.  If your pain medications do not control your pain you should notifiy your surgeon.    Tips for taking pain medications  To get the best pain relief possible, remember these points:    Take pain medications as directed, before pain becomes severe.    Pain medication can upset your stomach: taking it with food may help.    Constipation is a common side effect of pain medication. Drink plenty of  fluids.    Eat foods high in fiber. Take a stool softener if recommended by your doctor or pharmacist.    Do not drink alcohol, drive or operate machinery while taking pain medications.    Ask about other ways to control pain, such as with heat, ice or relaxation.    Call a doctor for any of the followin. Signs of infection (fever, growing tenderness at the surgery site, a large amount of drainage or bleeding, severe pain, foul-smelling drainage, redness, swelling).  2. It has been over 8 to 10 hours since surgery and you are still not able to urinate (pass water).  3. Headache for over 24 hours.  4. Numbness, tingling or weakness the day after surgery (if you had spinal anesthesia).  Your doctor is:  Dr. Xiao Hogan, General Surgery: [phone]                 Or dial [phone] and ask for the resident on call for:  General Surgery  For emergency care, call the:  [facility]:  [phone] (TTY for hearing impaired: [phone])

## 2017-04-20 NOTE — IP AVS SNAPSHOT
MRN:[id number]                      After Visit Summary   4/20/2017    Javier Fowler    MRN: [id number]           Thank you!     Thank you for choosing [facility] for your care. Our goal is always to provide you with excellent care. Hearing back from our patients is one way we can continue to improve our services. Please take a few minutes to complete the written survey that you may receive in the mail after you visit with us. Thank you!        Patient Information     Date Of Birth          1985        About your hospital stay     You were admitted on:  April 20, 2017 [facility]    You were discharged on:  April 20, 2017       Who to Call     For medical emergencies, please call 911.  For non-urgent questions about your medical care, please call your primary care provider or clinic, None  For questions related to your surgery, please call your surgery clinic        Attending Provider     Provider Xiao Segundo MD Surgery       Primary Care Provider    Physician No Ref-Primary       No address on file        After Care Instructions     Discharge Instructions       Follow up appointment as instructed by Surgeon and or RN    Activity  - No strenuous exercise for 4 weeks.  - No lifting, pushing, pulling more than 15 pounds for 4 weeks.   - Do not strain with bowel movements.  - Do not drive until you can press the brake pedal quickly and fully without pain.   - Do not operate a motor vehicle while taking narcotic pain medications.     Incisions  - You may shower and get incisions wet starting 48 hrs after surgery.  - Do not scrub incisions or submerge wounds (aka, bath, pool, hot tub, ect.) for 2 weeks.   - Remove wound dressing 48 hours after surgery.   - If purple dermabond glue was used, avoid applying any lotions or ointments.   - If steri-strips were used, they will fall off on their own.   - Leave incision open  to air.  Cover with gauze only if needed for comfort or to protect clothing from drainage.     Medications  - Do not take any additional Tylenol (acetaminophen) while using Percocet or Vicodin.  - Do not take more than 4,000mg of Tylenol (acetaminophen) in any 24 hour period, as this can cause liver damage.  - Take stool softeners such as Senna while you are using narcotics, but stop if you develop diarrhea.   - Wean yourself off of narcotic pain medications.     Follow-Up:  - Call your primary care provider to touch base regarding your recent admission.    - Call or return sooner than your regularly scheduled visit if you develop any of the following: fever, uncontrolled pain, uncontrolled nausea or vomiting, as well as increased redness, swelling, or drainage from your wound    -  Follow-up as needed in clinic with your surgeon as listed on your discharge paperwork. If your surgeon is not available in this time-frame you might see one of their partners.  You should be contacted by a nurse within 3 business days to check how you are doing. If you are not contacted please call RN care coordinator:  Emelyn Gutiérrez [phone] or Pascale Amaya [phone]    If you are receiving home care please inform your home care nurse of our contact number.    You may also call the Surgery Call-Center to speak with a Triage Nurse or to make an appointment: [phone].                  Further instructions from your care team       [facility]  Home Care Following Anesthesia  For 24 hours after surgery:  1. Get plenty of rest.  A responsible adult must stay with you for at least 24 hours after you leave the surgery center.  2. Do not drive or use heavy equipment.  If you have weakness or tingling, don't drive or use heavy equipment until this feeling goes away.   3. Do not drink alcohol.   4. Avoid strenuous or risky activities.  Ask for help when climbing stairs.  5. You may feel  "lightheaded.  IF so, sit for a few minutes before standing.  Have someone help you get up.   6. If you have nausea (feel sick to your stomach): Drink only clear liquids such as apple juice, ginger ale, broth or 7-Up.  Rest may also help.  Be sure to drink enough fluids.  Move to a regular diet as you feel able.   7. You may have a slight fever.  Call the doctor if your fever is over 100 F (37.7 C) (taken under the tongue) or lasts longer than 24 hours.  8. You may have a dry mouth, a sore throat, muscle aches or trouble sleeping. These should go away after 24 hours.  9. Do not make important or legal decisions.     Today you received a Marcaine or bupivacaine injection to numb the area near your surgery site.  This is a block using local anesthetic or \"numbing\" medication injected around the nerves to anesthetize or \"numb\" the area supplied by those nerves.  This block is injected into the muscle layer near your surgical site.  The medication may numb the location where you had surgery for 6-18 hours, but may last up to 24 hours.  If your surgical site is an arm or leg you should be careful with your affected limb, since it is possible to injure your limb without being aware of it due to the numbing.  Until full feeling returns, you should guard against bumping or hitting your limb, and avoid extreme hot or cold temperatures on the skin.  As the block wears off, the feeling will return as a tingling or prickly sensation near your surgical site.  You will experience more discomfort from your incision as the feeling returns.  You may want to take a pain pill (a narcotic or Tylenol if this was prescribed by your surgeon) when you start to experience mild pain before the pain beccomes more severe.  If your pain medications do not control your pain you should notifiy your surgeon.    Tips for taking pain medications  To get the best pain relief possible, remember these points:    Take pain medications as directed, before " "pain becomes severe.    Pain medication can upset your stomach: taking it with food may help.    Constipation is a common side effect of pain medication. Drink plenty of  fluids.    Eat foods high in fiber. Take a stool softener if recommended by your doctor or pharmacist.    Do not drink alcohol, drive or operate machinery while taking pain medications.    Ask about other ways to control pain, such as with heat, ice or relaxation.    Call a doctor for any of the followin. Signs of infection (fever, growing tenderness at the surgery site, a large amount of drainage or bleeding, severe pain, foul-smelling drainage, redness, swelling).  2. It has been over 8 to 10 hours since surgery and you are still not able to urinate (pass water).  3. Headache for over 24 hours.  4. Numbness, tingling or weakness the day after surgery (if you had spinal anesthesia).  Your doctor is:  Dr. Xiao Hogan, General Surgery: [phone]                 Or dial [phone] and ask for the resident on call for:  General Surgery  For emergency care, call the:  [place]:  [phone] (TTY for hearing impaired: [phone])                  Pending Results     Date and Time Order Name Status Description    2017 1406 Surgical pathology exam In process             Admission Information     Date & Time Provider Department Dept. Phone    2017 Xiao Hogan MD [facility] [phone]      Your Vitals Were     Blood Pressure Temperature Respirations Height Weight Pulse Oximetry    108/59 99.1  F (37.3  C) (Temporal) 16 1.702 m (5' 7\") 73.9 kg (163 lb) 98%    BMI (Body Mass Index)                   25.53 kg/m2           MyChart Information     [employer] gives you secure access to your electronic health record. If you see a primary care provider, you can also send messages to your care team and make appointments. If you have questions, please call your primary care clinic.  If " you do not have a primary care provider, please call [phone] and they will assist you.      [employer] is an electronic gateway that provides easy, online access to your medical records. With [employer], you can request a clinic appointment, read your test results, renew a prescription or communicate with your care team.     To access your existing account, please contact your [facility] or call [phone] for assistance.        Care EveryWhere ID     This is your Care EveryWhere ID. This could be used by other organizations to access your [place] medical records  [id number]           Review of your medicines      START taking        Dose / Directions    oxyCODONE 5 MG IR tablet   Commonly known as:  ROXICODONE   Used for:  Unilateral inguinal hernia without obstruction or gangrene, recurrence not specified        Dose:  5-10 mg   Take 1-2 tablets (5-10 mg) by mouth every 3 hours as needed for pain or other (Moderate to Severe)   Quantity:  20 tablet   Refills:  0         CONTINUE these medicines which have NOT CHANGED        Dose / Directions    ADVIL PO        Dose:  200 mg   Take 200 mg by mouth   Refills:  0       ALLEGRA PO        Dose:  30 mg   Take 30 mg by mouth daily   Refills:  0       BENADRYL PO        Dose:  1 tablet   Take 1 tablet by mouth as needed   Refills:  0       EYE DROPS OP        Refills:  0       MELATONIN PO        Dose:  1 tablet   Take 1 tablet by mouth as needed   Refills:  0       MULTIVITAMIN PO        Dose:  1 tablet   Take 1 tablet by mouth   Refills:  0       TYLENOL PO        Dose:  10 mg/kg   Take 10 mg/kg by mouth   Refills:  0            Where to get your medicines      Some of these will need a paper prescription and others can be bought over the counter. Ask your nurse if you have questions.     Bring a paper prescription for each of these medications     oxyCODONE 5 MG IR tablet                Protect others around you: Learn how to safely  use, store and throw away your medicines at www.disposemymeds.org.             Medication List: This is a list of all your medications and when to take them. Check marks below indicate your daily home schedule. Keep this list as a reference.      Medications           Morning Afternoon Evening Bedtime As Needed    ADVIL PO   Take 200 mg by mouth                                ALLEGRA PO   Take 30 mg by mouth daily                                BENADRYL PO   Take 1 tablet by mouth as needed                                EYE DROPS OP                                MELATONIN PO   Take 1 tablet by mouth as needed                                MULTIVITAMIN PO   Take 1 tablet by mouth                                oxyCODONE 5 MG IR tablet   Commonly known as:  ROXICODONE   Take 1-2 tablets (5-10 mg) by mouth every 3 hours as needed for pain or other (Moderate to Severe)   Last time this was given:  5 mg on 4/20/2017  3:34 PM                                TYLENOL PO   Take 10 mg/kg by mouth   Last time this was given:  975 mg on 4/20/2017 12:15 PM

## 2017-04-20 NOTE — ANESTHESIA CARE TRANSFER NOTE
Patient: Javier Fowler    Procedure(s):  Open Right Inguinal Hernia Repair with Mesh - Wound Class: I-Clean    Diagnosis: Inguinal Hernia  Diagnosis Additional Information: No value filed.    Anesthesia Type:   General, LMA     Note:  Airway :Room Air  Patient transferred to:PACU  Comments:   Transferred to: PACU    Patient vital signs: stable    Airway: none    Monitors and alarms on; PIV intact and patent, report given to PACU RN.     117/70, 80,16,98,98.9    Reyna Borja CRNA, APRN    4/20/2017  3:17 PM       Vitals: (Last set prior to Anesthesia Care Transfer)    CRNA VITALS  4/20/2017 1442 - 4/20/2017 1516      4/20/2017             Pulse: 71    SpO2: 98 %    Resp Rate (observed): (!)  1    Resp Rate (set): 10                Electronically Signed By: SONDRA Jordan CRNA  April 20, 2017  3:16 PM

## 2017-04-20 NOTE — ANESTHESIA POSTPROCEDURE EVALUATION
Patient: Javier Fowler    Procedure(s):  Open Right Inguinal Hernia Repair with Mesh - Wound Class: I-Clean    Diagnosis:Inguinal Hernia  Diagnosis Additional Information: No value filed.    Anesthesia Type:  General, LMA    Note:  Anesthesia Post Evaluation    Patient location during evaluation: PACU  Patient participation: Able to fully participate in evaluation  Level of consciousness: awake and alert  Pain management: adequate  Airway patency: patent  Cardiovascular status: hemodynamically stable  Respiratory status: nonlabored ventilation, spontaneous ventilation and room air  Hydration status: euvolemic  PONV: none     Anesthetic complications: None          Last vitals:  Vitals:    04/20/17 1547 04/20/17 1600 04/20/17 1618   BP: 108/59 102/62 106/62   Resp: 16 16 16   Temp:   36.6  C (97.9  F)   SpO2: 98%           Electronically Signed By: Ike Dorman MD  April 20, 2017  4:51 PM

## 2017-04-20 NOTE — IP AVS SNAPSHOT
[facility]    [address] [place] [address]    Phone:  [phone]    Fax:  [phone]                                       After Visit Summary   4/20/2017    Javier Fowler    MRN: [id number]           After Visit Summary Signature Page     I have received my discharge instructions, and my questions have been answered. I have discussed any challenges I see with this plan with the nurse or doctor.    ..........................................................................................................................................  Patient/Patient Representative Signature      ..........................................................................................................................................  Patient Representative Print Name and Relationship to Patient    ..................................................               ................................................  Date                                            Time    ..........................................................................................................................................  Reviewed by Signature/Title    ...................................................              ..............................................  Date                                                            Time

## 2017-04-21 ENCOUNTER — CARE COORDINATION (OUTPATIENT)
Dept: SURGERY | Facility: CLINIC | Age: 32
End: 2017-04-21

## 2017-04-21 NOTE — PROGRESS NOTES
Patient had an inguinal hernia repair with Dr. Hogan 4/20. He calls the office this morning stating he is feeling nauseous and is wondering what he can do to help with this. He states he was taking 1-2 Oxycodone Q3H during the night. He woke up to go to the bathroom and got up too quickly- states he got really hot and felt like he was going to faint. Discussed moving more slowly with activity due to the pain medication and recent surgery. The last time he took a pill he only took 1/2. He has been taking 250 mg Tylenol Q3H along with the Oxycodone. Discussed with patient that more than likely the Oxycodone is making him nauseous. Instructed patient to alternate between Tylenol and Ibuprofen Q3H and to save the Oxycodone for more moderate pain. He states he is eating small frequent meals (snacking) all the time. He is aware he should take the medication with food. He has Zofran at home that he can use TID. He took all 3 doses last night. Discussed that he should try and space out the Zofran to Q8H. He said he ate a salad last night and that he didn't think it was a good choice in food. He is going to try these things to see if his nausea resolves. He will call the office with any additional questions. He is aware he should be receiving a follow up call on Monday. He is in agreement of the plan.

## 2017-04-24 ENCOUNTER — CARE COORDINATION (OUTPATIENT)
Dept: SURGERY | Facility: CLINIC | Age: 32
End: 2017-04-24

## 2017-04-24 NOTE — PROGRESS NOTES
RN Post Op Care Coordination Note    Mr. Javier Fowler is a 31 year old male who underwent Open right inguinal hernia repair on 4/20 with  Dr. MAK Hogan for a history of inguinal hernia.  Spoke with Patient.    Support  Patient able to care for self independently     Health Status  Fevers/chills: Patient denies any fever or chills.  Nausea/Vomiting: Patient denies nausea/vomiting.  Eating/drinking: Patient is able to eat and drink without any complaints.  Bowel habits: Patient reports having a normal bowel movement.  Urinary: Voiding normally  Drains (CAROLE): N/A  Incisions: Patient denies any signs and symptoms of infection.  Pain: patient states he stopped taking Oxycodone and his nausea resolved. He is taking Tylenol and Ibuprofen prn and states he pain is tolerable.    Activity/Restrictions  Following restrictions outlined by surgeon.    Pathology reviewed with patient:  N/A    All of his questions were answered including reviewing restrictions, pathology, and wound care.  He will call this office if he has any further questions and/or concerns.      In lieu of a post-op clinic patient that patient would like to be contacted in approximately 10 days via telephone.    Whom and When to Call  Patient acknowledges understanding of how to manage any medication changes and when to seek medical care.     Patient advised that if after hour medical concerns arise to please call [phone] and choose option 4 to speak to the physician on call.     A copy of this note was routed to the primary surgeon.

## 2017-04-26 LAB — COPATH REPORT: NORMAL

## 2017-05-02 ENCOUNTER — CARE COORDINATION (OUTPATIENT)
Dept: SURGERY | Facility: CLINIC | Age: 32
End: 2017-05-02

## 2017-05-03 NOTE — PROGRESS NOTES
Patient had surgery with Dr. Hogan 4/20 for an open inguinal hernia repair. He calls the office stating he is experiencing pain. He said the incision above the groin feels better and has been healing well. He endorses quite a bit of testicular pain when using the stairs, walking, and urinating. He states he didn't wear the scrotal support after surgery, but has been wearing it the last 5 days. He states it feels better with it on. He has been taking 600 mg TID. He started back at work on Monday after taking 10 days off (calendar) to recover. Instructed patient to continue using the scrotal support and to use Ibuprofen. He can also add Tylenol to see if it helps with the discomfort. Informed patient that it can take 6-8 weeks for pain to completely resolve. He can also try using a heating pad or taking a hot bath to help with the testicular discomfort. Patient would like a call back in 2 weeks for a check in. He will call prior to that if he feels he needs to be seen in clinic. He is in agreement of the plan.

## 2017-05-08 ENCOUNTER — OFFICE VISIT (OUTPATIENT)
Dept: SURGERY | Facility: CLINIC | Age: 32
End: 2017-05-08

## 2017-05-08 ENCOUNTER — CARE COORDINATION (OUTPATIENT)
Dept: SURGERY | Facility: CLINIC | Age: 32
End: 2017-05-08

## 2017-05-08 VITALS
WEIGHT: 155.8 LBS | OXYGEN SATURATION: 99 % | HEART RATE: 79 BPM | SYSTOLIC BLOOD PRESSURE: 113 MMHG | TEMPERATURE: 98.5 F | BODY MASS INDEX: 24.45 KG/M2 | HEIGHT: 67 IN | DIASTOLIC BLOOD PRESSURE: 79 MMHG

## 2017-05-08 DIAGNOSIS — K40.90 UNILATERAL INGUINAL HERNIA WITHOUT OBSTRUCTION OR GANGRENE, RECURRENCE NOT SPECIFIED: Primary | ICD-10-CM

## 2017-05-08 ASSESSMENT — PAIN SCALES - GENERAL: PAINLEVEL: EXTREME PAIN (8)

## 2017-05-08 NOTE — NURSING NOTE
"Chief Complaint   Patient presents with     Surgical Followup     post op groin pain       Vitals:    05/08/17 1325   BP: 113/79   BP Location: Left arm   Patient Position: Chair   Cuff Size: Adult Regular   Pulse: 79   Temp: 98.5  F (36.9  C)   TempSrc: Oral   SpO2: 99%   Weight: 155 lb 12.8 oz   Height: 5' 7\"       Body mass index is 24.4 kg/(m^2).    Margaret Martin                          "

## 2017-05-08 NOTE — PROGRESS NOTES
"ACS Clinic PN    S: Pt reports pain with movement in scrotum and superior-medial Right thigh.  Scrotal support helps marginally, but pt. has not  Returned to baseline activity level and has not been exercising.  He denies fevers, Chills, nausea or vomiting.  He is sleeping well and only has pain with movement.  He stopped taking the Oxycodone because in constipated him and made him woozy.    O:. 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  Nontender  Tenderness to Right scrotum and testicle    A/P: 30 yo M s/p RIght open inguinal hernia repair.  Persistent pain in Right testicle, medial thigh and scrotum.  This is concerning for Ilioinguinal Nerve pain.  I injected 1% Lidocaine with Epi (~10mL) into region above pubic tubercle and pt experienced relief of pain.    Will discuss further treatments in Surgical Indications Conference and report back to pt.      "

## 2017-05-08 NOTE — MR AVS SNAPSHOT
After Visit Summary   5/8/2017    Javier Fowler    MRN: [id number]           Patient Information     Date Of Birth          1985        Visit Information        Provider Department      5/8/2017 1:00 PM Xiao Hogan MD [facility]        Today's Diagnoses     Unilateral inguinal hernia without obstruction or gangrene, recurrence not specified    -  1       Follow-ups after your visit        Who to contact     Please call your clinic at [phone] to:    Ask questions about your health    Make or cancel appointments    Discuss your medicines    Learn about your test results    Speak to your doctor   If you have compliments or concerns about an experience at your clinic, or if you wish to file a complaint, please contact [facility] Patient Relations at [phone] or email us at [email or web address]         Additional Information About Your Visit        MyChart Information     [employer] gives you secure access to your electronic health record. If you see a primary care provider, you can also send messages to your care team and make appointments. If you have questions, please call your primary care clinic.  If you do not have a primary care provider, please call [phone] and they will assist you.      [employer] is an electronic gateway that provides easy, online access to your medical records. With [employer], you can request a clinic appointment, read your test results, renew a prescription or communicate with your care team.     To access your existing account, please contact your [facility] or call [phone] for assistance.        Care EveryWhere ID     This is your Care EveryWhere ID. This could be used by other organizations to access your [facility] medical records  [id number]        Your Vitals Were     Pulse Temperature Height Pulse Oximetry BMI (Body Mass Index)       79 98.5  F (36.9  C) (Oral)  "1.702 m (5' 7\") 99% 24.4 kg/m2        Blood Pressure from Last 3 Encounters:   05/08/17 113/79   04/20/17 106/62   04/14/17 122/73    Weight from Last 3 Encounters:   05/08/17 70.7 kg (155 lb 12.8 oz)   04/20/17 73.9 kg (163 lb)   04/14/17 74.1 kg (163 lb 6.4 oz)              Today, you had the following     No orders found for display       Primary Care Provider    Physician No Ref-Primary       No address on file        Thank you!     Thank you for choosing [facility]  for your care. Our goal is always to provide you with excellent care. Hearing back from our patients is one way we can continue to improve our services. Please take a few minutes to complete the written survey that you may receive in the mail after your visit with us. Thank you!             Your Updated Medication List - Protect others around you: Learn how to safely use, store and throw away your medicines at www.disposemymeds.org.          This list is accurate as of: 5/8/17  2:05 PM.  Always use your most recent med list.                   Brand Name Dispense Instructions for use    ADVIL PO      Take 200 mg by mouth       ALLEGRA PO      Take 30 mg by mouth daily       BENADRYL PO      Take 1 tablet by mouth as needed       EYE DROPS OP          MELATONIN PO      Take 1 tablet by mouth as needed       MULTIVITAMIN PO      Take 1 tablet by mouth       oxyCODONE 5 MG IR tablet    ROXICODONE    20 tablet    Take 1-2 tablets (5-10 mg) by mouth every 3 hours as needed for pain or other (Moderate to Severe)       TYLENOL PO      Take 10 mg/kg by mouth         "

## 2017-05-08 NOTE — LETTER
"5/8/2017     RE: Javier Fowler  [address] [place] [address]     Dear Colleague,    Thank you for referring your patient, Javier Fowler, to the [facility] SURGERY at [facility]. Please see a copy of my visit note below.    ACS Clinic PN    S: Pt reports pain with movement in scrotum and superior-medial Right thigh.  Scrotal support helps marginally, but pt. has not  Returned to baseline activity level and has not been exercising.  He denies fevers, Chills, nausea or vomiting.  He is sleeping well and only has pain with movement.  He stopped taking the Oxycodone because in constipated him and made him woozy.    O:. 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  Nontender  Tenderness to Right scrotum and testicle    A/P: 30 yo M s/p RIght open inguinal hernia repair.  Persistent pain in Right testicle, medial thigh and scrotum.  This is concerning for Ilioinguinal Nerve pain.  I injected 1% Lidocaine with Epi (~10mL) into region above pubic tubercle and pt experienced relief of pain.    Will discuss further treatments in Surgical Indications Conference and report back to pt.      Again, thank you for allowing me to participate in the care of your patient.      Sincerely,  Xiao Hogan MD  "

## 2017-05-08 NOTE — PROGRESS NOTES
Called patient and left a message asking him to call back to schedule an appointment. Dr. Hogan could see him today at 1 or 3:30 if he is able, otherwise he can be scheduled with a partner.      [place] Andrea, MELVINA        Phone Number: [phone]                     Pt's #: [phone]   Pt of Dr. Hogan     Pt called in to schedule a post-op appt with Dr. Hogan, and I could not find an appt sooner than 7/5. Pt stated he is moving to [place] on 5/27 and wants to see Dr. Hogan on or before 5/25 for this reason. He is open to seeing other providers but understood he should see the provider who performed his surgery. Please contact Pt to discuss.     Miles Anderson in the Call Center

## 2017-05-11 ENCOUNTER — TELEPHONE (OUTPATIENT)
Dept: PALLIATIVE MEDICINE | Facility: CLINIC | Age: 32
End: 2017-05-11

## 2017-05-11 ENCOUNTER — CARE COORDINATION (OUTPATIENT)
Dept: SURGERY | Facility: CLINIC | Age: 32
End: 2017-05-11

## 2017-05-11 DIAGNOSIS — Z98.890 POSTOPERATIVE STATE: Primary | ICD-10-CM

## 2017-05-11 DIAGNOSIS — R10.31 INGUINAL PAIN, RIGHT: Primary | ICD-10-CM

## 2017-05-11 NOTE — PROGRESS NOTES
Received a message from patient stating the [facility] clinic does not do procedures such as nerve ablations. Patient will need to be referred to [facility] to have this done. A new referral has been placed. Called and explained this to patient. Provided him with the number to call and schedule. He will call with any further issues. Referral faxed to [facility] to call and schedule patient.

## 2017-05-11 NOTE — TELEPHONE ENCOUNTER
Left voicemail for patient to schedule interventional consult with Dr. Mari Campos.    Corinna CASH    [facility]

## 2017-05-11 NOTE — TELEPHONE ENCOUNTER
Pt called to schedule interventional consult with Dr. Mari Campos per notes. Pt unable to schedule due to moving to [place] on May 27th and there are no openings prior to that.    Kathe Burgess    [facility]

## 2017-05-11 NOTE — PROGRESS NOTES
Received a message from Dr. Hogan stating patient continues to have pain after having the injection. She is requesting a referral to the pain clinic for nerve ablation. Pain clinic order has been placed. Called patient and relayed the above information. Provided patient with the number so he can call to set up an appointment. He will call to schedule.

## 2017-05-11 NOTE — TELEPHONE ENCOUNTER
"It looks like he is having ilioinguinal nerve pain.  Would likely be a US guided nerve block, however, they indicated in the care conference note that they wanted a nerve \"ablation\".  I would set up a interventional consult with Dr. Mari Campos up at [place] and let him decide based on that what the best course of action is.  I will route to him as well.     Jacklyn Alvarez MD   "

## 2017-05-11 NOTE — TELEPHONE ENCOUNTER
Patient calling to schedule an injection, order states TBD by pain interventionalist . Dx is postoperative state, please review and advise on scheduling.      Shonna BAXTER    [facility]

## 2017-05-12 ENCOUNTER — CARE COORDINATION (OUTPATIENT)
Dept: SURGERY | Facility: CLINIC | Age: 32
End: 2017-05-12

## 2017-05-12 NOTE — PROGRESS NOTES
Patient left a message stating he was unable to get an appointment until June with [facility] in [place]. He states he is moving to [place] 5/27. Called [facility] regarding an earlier appointment. He is scheduled 5/22 in [place] for a 9 am check in with Dr. Cox. Called and relayed this information to patient. He is aware it is for a consultation. After the consultation, a prior authorization may need to be done for the actual procedure. Informed patient to check with his insurance company to see if it would be covered, or if he would need a prior authorization done. Patient will look into this. He states he will call if this appointment will not work, and if he needs a referral to a pain clinic in [place]. He is in agreement of the plan.

## 2017-05-12 NOTE — TELEPHONE ENCOUNTER
Attempted to call pt and discuss the fact that we had no opening priot to him leaving town. Will leave encounter open in case of call back. According to chart he does have an appt with [facility] 5/22/2017.     Marely Reyes RN, [facility]  Pain Clinic Care Coordinator

## 2017-05-12 NOTE — TELEPHONE ENCOUNTER
Information noted.  He may have to establish with an interventionalist in [facility] if there is no availability.    Nael Campos MD  [facility]

## 2017-05-17 NOTE — TELEPHONE ENCOUNTER
No call back from pt - closing encounter.     Marely Reyes RN, [facility]  Pain Clinic Care Coordinator

## 2019-11-07 ENCOUNTER — HEALTH MAINTENANCE LETTER (OUTPATIENT)
Age: 34
End: 2019-11-07

## 2020-11-29 ENCOUNTER — HEALTH MAINTENANCE LETTER (OUTPATIENT)
Age: 35
End: 2020-11-29

## 2021-09-25 ENCOUNTER — HEALTH MAINTENANCE LETTER (OUTPATIENT)
Age: 36
End: 2021-09-25

## 2022-01-15 ENCOUNTER — HEALTH MAINTENANCE LETTER (OUTPATIENT)
Age: 37
End: 2022-01-15

## 2022-12-26 ENCOUNTER — HEALTH MAINTENANCE LETTER (OUTPATIENT)
Age: 37
End: 2022-12-26

## 2023-04-16 ENCOUNTER — HEALTH MAINTENANCE LETTER (OUTPATIENT)
Age: 38
End: 2023-04-16

## (undated) DEVICE — SPONGE RAY-TEC 4X8" 7318

## (undated) DEVICE — DRAIN PENROSE 0.50"X18" LATEX FREE GR203

## (undated) DEVICE — SU SILK 4-0 TIE 12X30" A303H

## (undated) DEVICE — ESU ELEC BLADE 2.75" COATED/INSULATED E1455

## (undated) DEVICE — SU VICRYL 3-0 SH 27" J316H

## (undated) DEVICE — GLOVE PROTEXIS MICRO 6.5  2D73PM65

## (undated) DEVICE — ESU GROUND PAD ADULT W/CORD E7507

## (undated) DEVICE — SU VICRYL 4-0 P-3 18" UND  J494H

## (undated) DEVICE — SU PROLENE 2-0 SHDA 36" 8523H

## (undated) DEVICE — BLADE KNIFE SURG 15 371115

## (undated) DEVICE — GLOVE PROTEXIS BLUE W/NEU-THERA 6.5  2D73EB65

## (undated) DEVICE — LINEN TOWEL PACK X6 WHITE 5487

## (undated) DEVICE — PREP CHLORAPREP 26ML TINTED ORANGE  260815

## (undated) DEVICE — SU SILK 3-0 TIE 12X30" A304H

## (undated) DEVICE — DRSG PRIMAPORE 03 1/8X6" 66000318

## (undated) DEVICE — SU VICRYL 2-0 SH 27" UND J417H

## (undated) DEVICE — LINEN TOWEL PACK X5 5464

## (undated) DEVICE — MITT SURGICAL PREP HAIR REMOVER LATEX FREE SPM100

## (undated) DEVICE — LINEN GOWN XLG 5407

## (undated) DEVICE — PACK MINOR CUSTOM ASC

## (undated) DEVICE — DRSG STERI STRIP 1/2X4" R1547

## (undated) DEVICE — DRAPE LAP TRANSVERSE 29421

## (undated) DEVICE — BLADE CLIPPER SGL USE 9680

## (undated) DEVICE — SU SILK 2-0 TIE 12X30" A305H

## (undated) RX ORDER — PROPOFOL 10 MG/ML
INJECTION, EMULSION INTRAVENOUS
Status: DISPENSED
Start: 2017-04-20

## (undated) RX ORDER — FENTANYL CITRATE 50 UG/ML
INJECTION, SOLUTION INTRAMUSCULAR; INTRAVENOUS
Status: DISPENSED
Start: 2017-04-20

## (undated) RX ORDER — OXYCODONE HYDROCHLORIDE 5 MG/1
TABLET ORAL
Status: DISPENSED
Start: 2017-04-20

## (undated) RX ORDER — CLINDAMYCIN PHOSPHATE 900 MG/50ML
INJECTION, SOLUTION INTRAVENOUS
Status: DISPENSED
Start: 2017-04-20

## (undated) RX ORDER — LIDOCAINE HYDROCHLORIDE AND EPINEPHRINE 10; 10 MG/ML; UG/ML
INJECTION, SOLUTION INFILTRATION; PERINEURAL
Status: DISPENSED
Start: 2017-05-08

## (undated) RX ORDER — KETOROLAC TROMETHAMINE 30 MG/ML
INJECTION, SOLUTION INTRAMUSCULAR; INTRAVENOUS
Status: DISPENSED
Start: 2017-04-20

## (undated) RX ORDER — GLYCOPYRROLATE 0.2 MG/ML
INJECTION INTRAMUSCULAR; INTRAVENOUS
Status: DISPENSED
Start: 2017-04-20

## (undated) RX ORDER — DEXAMETHASONE SODIUM PHOSPHATE 4 MG/ML
INJECTION, SOLUTION INTRA-ARTICULAR; INTRALESIONAL; INTRAMUSCULAR; INTRAVENOUS; SOFT TISSUE
Status: DISPENSED
Start: 2017-04-20

## (undated) RX ORDER — ACETAMINOPHEN 325 MG/1
TABLET ORAL
Status: DISPENSED
Start: 2017-04-20

## (undated) RX ORDER — GABAPENTIN 300 MG/1
CAPSULE ORAL
Status: DISPENSED
Start: 2017-04-20

## (undated) RX ORDER — ONDANSETRON 2 MG/ML
INJECTION INTRAMUSCULAR; INTRAVENOUS
Status: DISPENSED
Start: 2017-04-20